# Patient Record
Sex: FEMALE | Race: WHITE | Employment: FULL TIME | URBAN - METROPOLITAN AREA
[De-identification: names, ages, dates, MRNs, and addresses within clinical notes are randomized per-mention and may not be internally consistent; named-entity substitution may affect disease eponyms.]

---

## 2021-03-10 ENCOUNTER — TRANSCRIBE ORDER (OUTPATIENT)
Dept: SCHEDULING | Age: 61
End: 2021-03-10

## 2021-03-10 DIAGNOSIS — Z12.31 VISIT FOR SCREENING MAMMOGRAM: Primary | ICD-10-CM

## 2021-03-26 ENCOUNTER — HOSPITAL ENCOUNTER (OUTPATIENT)
Dept: MAMMOGRAPHY | Age: 61
Discharge: HOME | End: 2021-03-26
Attending: OBSTETRICS & GYNECOLOGY
Payer: COMMERCIAL

## 2021-03-26 DIAGNOSIS — Z12.31 VISIT FOR SCREENING MAMMOGRAM: ICD-10-CM

## 2021-03-26 PROCEDURE — 77067 SCR MAMMO BI INCL CAD: CPT

## 2022-02-17 NOTE — PROGRESS NOTES
Annual exam ages 40-58      Indio Poole is a No obstetric history on file. ,  63 y.o. female   No LMP recorded. Patient is postmenopausal.    She presents for her annual checkup. She is having no significant problems. Menstrual status:    Her periods are absent. Contraception:    The current method of family planning is NA post menopause. Hormonal status:  She reports no perimenstrual type symptoms. She is not having vasomotor symptoms. The patient is not using any ERT. Sexual history:    She  has no history on file for sexual activity. Medical conditions:    Since her last annual GYN exam about one year ago, she has not the following changes in her health history: none. Surgical history confirmed with patient. has no past surgical history on file. Pap and Mammogram History:    Her most recent Pap smear was normal, obtained 1 year(s) ago per pt. She will send us a copy. The patient had a recent mammogram 3/26/21 which was negative for malignancy. Breast Cancer History/Substance Abuse: negative      Osteoporosis History:    Family history does not include a first or second degree relative with osteopenia or osteoporosis. No past medical history on file. No past surgical history on file. Allergies: Patient has no allergy information on record. Tobacco History:  has no history on file for tobacco use. Alcohol Abuse:  has no history on file for alcohol use. Drug Abuse:  has no history on file for drug use. Family Medical/Cancer History: No family history on file.      Review of Systems - History obtained from the patient  Constitutional: negative for weight loss, fever, night sweats  HEENT: negative for hearing loss, earache, congestion, snoring, sorethroat  CV: negative for chest pain, palpitations, edema  Resp: negative for cough, shortness of breath, wheezing  GI: negative for change in bowel habits, abdominal pain, black or bloody stools  : negative for frequency, dysuria, hematuria, vaginal discharge  MSK: negative for back pain, joint pain, muscle pain  Breast: negative for breast lumps, nipple discharge, galactorrhea  Skin :negative for itching, rash, hives  Neuro: negative for dizziness, headache, confusion, weakness  Psych: negative for anxiety, depression, change in mood  Heme/lymph: negative for bleeding, bruising, pallor    Physical Exam    Constitutional  · Appearance: well-nourished, well developed, alert, in no acute distress    HENT  · Head and Face: appears normal    Neck  · Inspection/Palpation: normal appearance, no masses or tenderness  · Lymph Nodes: no lymphadenopathy present  · Thyroid: gland size normal, nontender, no nodules or masses present on palpation    Chest  · Respiratory Effort: breathing unlabored    Breasts  · Inspection of Breasts: breasts symmetrical, no skin changes, no discharge present, nipple appearance normal, no skin retraction present  · Palpation of Breasts and Axillae: no masses present on palpation, no breast tenderness  · Axillary Lymph Nodes: no lymphadenopathy present    Gastrointestinal  · Abdominal Examination: abdomen non-tender to palpation, normal bowel sounds, no masses present  · Liver and spleen: no hepatomegaly present, spleen not palpable  · Hernias: no hernias identified    Genitourinary  · External Genitalia: normal appearance for age, no discharge present, no tenderness present, no inflammatory lesions present, no masses present, no atrophy present  · Vagina: normal vaginal vault without central or paravaginal defects, no discharge present, no inflammatory lesions present, no masses present  · Bladder: non-tender to palpation  · Urethra: appears normal  · Cervix: normal   · Uterus: normal size, shape and consistency  · Adnexa: no adnexal tenderness present, no adnexal masses present  · Perineum: perineum within normal limits, no evidence of trauma, no rashes or skin lesions present  · Anus: anus within normal limits, no hemorrhoids present  · Inguinal Lymph Nodes: no lymphadenopathy present    Skin  · General Inspection: no rash, no lesions identified    Neurologic/Psychiatric  · Mental Status:  · Orientation: grossly oriented to person, place and time  · Mood and Affect: mood normal, affect appropriate    Assessment:  Routine gynecologic examination  Her current medical status is satisfactory with no evidence of significant gynecologic issues. No h/o abnormal pap, last pap last year, pt will upload copy on [employer].     Plan:  Counseled re: diet, exercise, healthy lifestyle  Return for yearly wellness visits  Rec annual mammogram

## 2022-02-18 ENCOUNTER — OFFICE VISIT (OUTPATIENT)
Dept: OBGYN CLINIC | Age: 62
End: 2022-02-18
Payer: COMMERCIAL

## 2022-02-18 VITALS — WEIGHT: 128 LBS | SYSTOLIC BLOOD PRESSURE: 138 MMHG | DIASTOLIC BLOOD PRESSURE: 77 MMHG

## 2022-02-18 DIAGNOSIS — Z01.419 ENCOUNTER FOR GYNECOLOGICAL EXAMINATION WITHOUT ABNORMAL FINDING: Primary | ICD-10-CM

## 2022-02-18 PROCEDURE — 99396 PREV VISIT EST AGE 40-64: CPT | Performed by: OBSTETRICS & GYNECOLOGY

## 2022-02-18 RX ORDER — BUPROPION HYDROCHLORIDE 200 MG/1
200 TABLET, EXTENDED RELEASE ORAL 2 TIMES DAILY
COMMUNITY

## 2022-02-18 RX ORDER — TRAZODONE HYDROCHLORIDE 100 MG/1
100 TABLET ORAL
COMMUNITY

## 2022-02-18 RX ORDER — ZOLPIDEM TARTRATE 10 MG/1
TABLET ORAL
COMMUNITY

## 2022-02-18 RX ORDER — BUSPIRONE HYDROCHLORIDE 15 MG/1
15 TABLET ORAL 3 TIMES DAILY
COMMUNITY

## 2023-06-30 ENCOUNTER — OFFICE VISIT (OUTPATIENT)
Age: 63
End: 2023-06-30

## 2023-06-30 VITALS — SYSTOLIC BLOOD PRESSURE: 123 MMHG | DIASTOLIC BLOOD PRESSURE: 80 MMHG | WEIGHT: 121 LBS

## 2023-06-30 DIAGNOSIS — Z01.419 ENCOUNTER FOR GYNECOLOGICAL EXAMINATION (GENERAL) (ROUTINE) WITHOUT ABNORMAL FINDINGS: Primary | ICD-10-CM

## 2023-06-30 RX ORDER — BUPROPION HYDROCHLORIDE 100 MG/1
TABLET, EXTENDED RELEASE ORAL
COMMUNITY
Start: 2023-06-01

## 2023-07-10 LAB
CYTOLOGIST CVX/VAG CYTO: NORMAL
CYTOLOGY CVX/VAG DOC CYTO: NORMAL
CYTOLOGY CVX/VAG DOC THIN PREP: NORMAL
DX ICD CODE: NORMAL
HPV GENOTYPE REFLEX: NORMAL
HPV I/H RISK 4 DNA CVX QL PROBE+SIG AMP: NEGATIVE
Lab: NORMAL
OTHER STN SPEC: NORMAL
STAT OF ADQ CVX/VAG CYTO-IMP: NORMAL

## 2024-06-27 NOTE — PROGRESS NOTES
Pinky Ortiz is a 64 y.o. female returns for an annual exam     Chief Complaint   Patient presents with    Annual Exam       No LMP recorded. Patient is postmenopausal.  Her periods are absent.  Problems: discuss dexa scan.   Birth Control: post menopausal status.  Last Pap: normal obtained 1 year(s) ago on 6/30/23.  She does not have a history of DWAYNE 2, 3 or cervical cancer.   Last Mammogram: had her mammogram today in our office. It was normal.       Examination chaperoned by Nancy Carl MA.

## 2024-07-01 ENCOUNTER — OFFICE VISIT (OUTPATIENT)
Age: 64
End: 2024-07-01
Payer: COMMERCIAL

## 2024-07-01 VITALS — HEART RATE: 76 BPM | WEIGHT: 121 LBS | DIASTOLIC BLOOD PRESSURE: 77 MMHG | SYSTOLIC BLOOD PRESSURE: 123 MMHG

## 2024-07-01 DIAGNOSIS — E28.39 HYPOESTROGENISM: ICD-10-CM

## 2024-07-01 DIAGNOSIS — Z01.419 ENCOUNTER FOR GYNECOLOGICAL EXAMINATION WITHOUT ABNORMAL FINDING: Primary | ICD-10-CM

## 2024-07-01 PROCEDURE — 99396 PREV VISIT EST AGE 40-64: CPT | Performed by: OBSTETRICS & GYNECOLOGY

## 2024-07-01 NOTE — PROGRESS NOTES
Annual exam    Pinky Ortiz is a ,  64 y.o. female   No LMP recorded. Patient is postmenopausal.    She presents for her annual checkup.     She has no significant complaints     Hormonal status:  She reports no perimenstrual type symptoms.   She is not having vasomotor symptoms.  The patient is not using any ERT.     Sexual history:    She  reports that she is not currently sexually active and has had partner(s) who are female and male.    Per Nursing Note:   No LMP recorded. Patient is postmenopausal.  Her periods are absent.  Problems: discuss dexa scan.   Birth Control: post menopausal status.  Last Pap: normal obtained 1 year(s) ago on 23.  She does not have a history of DWAYNE 2, 3 or cervical cancer.   Last Mammogram: had her mammogram today in our office. It was normal.     Past Medical History:   Diagnosis Date    Colon polyp     precancerous     Depression     Menopausal symptoms Ongoing    Psychiatric problem     Anxiety     Past Surgical History:   Procedure Laterality Date    DILATION AND CURETTAGE      DILATION AND CURETTAGE OF UTERUS         Current Outpatient Medications   Medication Sig Dispense Refill    buPROPion (WELLBUTRIN SR) 100 MG extended release tablet TAKE 2 TABLETS BY MOUTH EVERY MORNING AND 1 TABLET AT NOON      busPIRone (BUSPAR) 15 MG tablet Take 15 mg by mouth 3 times daily      traZODone (DESYREL) 100 MG tablet Take 1 tablet by mouth nightly      zolpidem (AMBIEN) 10 MG tablet Take by mouth.      buPROPion (WELLBUTRIN SR) 200 MG extended release tablet Take 1 tablet by mouth 2 times daily       No current facility-administered medications for this visit.     Allergies: Sulfa antibiotics     Tobacco History:  reports that she has never smoked. She has never used smokeless tobacco.  Alcohol Abuse:  reports current alcohol use of about 7.0 standard drinks of alcohol per week.  Drug Abuse:  reports no history of drug use.    Family Medical/Cancer History:

## 2024-07-12 ENCOUNTER — HOSPITAL ENCOUNTER (OUTPATIENT)
Facility: HOSPITAL | Age: 64
Discharge: HOME | End: 2024-07-12
Attending: OBSTETRICS & GYNECOLOGY
Payer: COMMERCIAL

## 2024-07-12 VITALS — BODY MASS INDEX: 20.49 KG/M2 | WEIGHT: 120 LBS | HEIGHT: 64 IN

## 2024-07-12 DIAGNOSIS — E28.39 HYPOESTROGENISM: ICD-10-CM

## 2024-07-12 PROCEDURE — 77080 DXA BONE DENSITY AXIAL: CPT

## 2025-07-03 NOTE — PROGRESS NOTES
Pinky Ortiz is a 65 y.o. female returns for an annual exam     No chief complaint on file.      No LMP recorded. Patient is postmenopausal.  Problems: no problems  Birth Control: post menopausal status.  Last Pap: normal obtained 3 year(s) ago.  She does not have a history of DWAYNE 2, 3 or cervical cancer.   Last Mammogram: had her mammogram today in our office.          Examination chaperoned by JU WANG RN.

## 2025-07-07 ENCOUNTER — OFFICE VISIT (OUTPATIENT)
Age: 65
End: 2025-07-07
Payer: COMMERCIAL

## 2025-07-07 ENCOUNTER — RESULTS FOLLOW-UP (OUTPATIENT)
Age: 65
End: 2025-07-07

## 2025-07-07 VITALS
WEIGHT: 124 LBS | RESPIRATION RATE: 16 BRPM | DIASTOLIC BLOOD PRESSURE: 69 MMHG | HEART RATE: 75 BPM | BODY MASS INDEX: 21.28 KG/M2 | SYSTOLIC BLOOD PRESSURE: 120 MMHG

## 2025-07-07 DIAGNOSIS — Z01.419 ENCOUNTER FOR WELL WOMAN EXAM WITH ROUTINE GYNECOLOGICAL EXAM: Primary | ICD-10-CM

## 2025-07-07 PROCEDURE — 99396 PREV VISIT EST AGE 40-64: CPT | Performed by: OBSTETRICS & GYNECOLOGY

## 2025-07-07 PROCEDURE — 99459 PELVIC EXAMINATION: CPT | Performed by: OBSTETRICS & GYNECOLOGY

## 2025-07-07 SDOH — ECONOMIC STABILITY: FOOD INSECURITY: WITHIN THE PAST 12 MONTHS, YOU WORRIED THAT YOUR FOOD WOULD RUN OUT BEFORE YOU GOT MONEY TO BUY MORE.: NEVER TRUE

## 2025-07-07 SDOH — ECONOMIC STABILITY: FOOD INSECURITY: WITHIN THE PAST 12 MONTHS, THE FOOD YOU BOUGHT JUST DIDN'T LAST AND YOU DIDN'T HAVE MONEY TO GET MORE.: NEVER TRUE

## 2025-07-07 ASSESSMENT — PATIENT HEALTH QUESTIONNAIRE - PHQ9
SUM OF ALL RESPONSES TO PHQ QUESTIONS 1-9: 0
1. LITTLE INTEREST OR PLEASURE IN DOING THINGS: NOT AT ALL
SUM OF ALL RESPONSES TO PHQ QUESTIONS 1-9: 0
SUM OF ALL RESPONSES TO PHQ QUESTIONS 1-9: 0
2. FEELING DOWN, DEPRESSED OR HOPELESS: NOT AT ALL
SUM OF ALL RESPONSES TO PHQ QUESTIONS 1-9: 0

## 2025-07-07 NOTE — PROGRESS NOTES
Annual exam    Pinky Ortiz is a ,  65 y.o. female   No LMP recorded. Patient is postmenopausal.    She presents for her annual checkup.     She has no significant complaints     Hormonal status:  She reports no perimenstrual type symptoms.   She is not having vasomotor symptoms.  The patient is not using ERT.     Sexual history:    She  reports that she is not currently sexually active and has had partner(s) who are female and male.    Per Rooming Note:   No LMP recorded. Patient is postmenopausal.  Problems: no problems  Birth Control: post menopausal status.  Last Pap: normal obtained 3 year(s) ago.  She does not have a history of DWAYNE 2, 3 or cervical cancer.   Last Mammogram: had her mammogram today in our office.      Past Medical History:   Diagnosis Date    Colon polyp     precancerous     Depression     Menopausal symptoms Ongoing    Psychiatric problem     Anxiety    Routine Papanicolaou smear 2023    normal     Past Surgical History:   Procedure Laterality Date    DILATION AND CURETTAGE      DILATION AND CURETTAGE OF UTERUS         Current Outpatient Medications   Medication Sig Dispense Refill    Chlorpheniramine Maleate (ALLERGY PO) Take by mouth      VITAMIN D PO Take by mouth      CALCIUM PO Take by mouth      Multiple Vitamin (MULTIVITAMIN PO) Take by mouth      Omega-3 Fatty Acids (FISH OIL PO) Take by mouth      buPROPion (WELLBUTRIN SR) 100 MG extended release tablet TAKE 2 TABLETS BY MOUTH EVERY MORNING AND 1 TABLET AT NOON      busPIRone (BUSPAR) 15 MG tablet Take 15 mg by mouth 3 times daily      traZODone (DESYREL) 100 MG tablet Take 1 tablet by mouth nightly      zolpidem (AMBIEN) 10 MG tablet Take by mouth.      buPROPion (WELLBUTRIN SR) 200 MG extended release tablet Take 1 tablet by mouth 2 times daily       No current facility-administered medications for this visit.     Allergies: Sulfa antibiotics     Tobacco History:  reports that she has never smoked. She